# Patient Record
Sex: MALE | Race: WHITE | Employment: UNEMPLOYED | URBAN - METROPOLITAN AREA
[De-identification: names, ages, dates, MRNs, and addresses within clinical notes are randomized per-mention and may not be internally consistent; named-entity substitution may affect disease eponyms.]

---

## 2020-12-26 ENCOUNTER — APPOINTMENT (OUTPATIENT)
Dept: GENERAL RADIOLOGY | Age: 35
End: 2020-12-26
Payer: OTHER MISCELLANEOUS

## 2020-12-26 ENCOUNTER — HOSPITAL ENCOUNTER (EMERGENCY)
Age: 35
Discharge: HOME | End: 2020-12-26
Attending: EMERGENCY MEDICINE
Payer: OTHER MISCELLANEOUS

## 2020-12-26 ENCOUNTER — APPOINTMENT (OUTPATIENT)
Dept: CT IMAGING | Age: 35
End: 2020-12-26
Payer: OTHER MISCELLANEOUS

## 2020-12-26 VITALS
WEIGHT: 160 LBS | RESPIRATION RATE: 16 BRPM | DIASTOLIC BLOOD PRESSURE: 69 MMHG | BODY MASS INDEX: 23.7 KG/M2 | OXYGEN SATURATION: 99 % | TEMPERATURE: 98 F | SYSTOLIC BLOOD PRESSURE: 109 MMHG | HEART RATE: 77 BPM | HEIGHT: 69 IN

## 2020-12-26 PROCEDURE — 99284 EMERGENCY DEPT VISIT MOD MDM: CPT

## 2020-12-26 PROCEDURE — 72100 X-RAY EXAM L-S SPINE 2/3 VWS: CPT

## 2020-12-26 PROCEDURE — 72125 CT NECK SPINE W/O DYE: CPT

## 2020-12-26 PROCEDURE — 6370000000 HC RX 637 (ALT 250 FOR IP): Performed by: EMERGENCY MEDICINE

## 2020-12-26 PROCEDURE — 72072 X-RAY EXAM THORAC SPINE 3VWS: CPT

## 2020-12-26 RX ORDER — CYCLOBENZAPRINE HCL 10 MG
10 TABLET ORAL 3 TIMES DAILY PRN
Qty: 15 TABLET | Refills: 0 | Status: SHIPPED | OUTPATIENT
Start: 2020-12-26 | End: 2020-12-31

## 2020-12-26 RX ORDER — ACETAMINOPHEN 325 MG/1
650 TABLET ORAL ONCE
Status: COMPLETED | OUTPATIENT
Start: 2020-12-26 | End: 2020-12-26

## 2020-12-26 RX ADMIN — ACETAMINOPHEN 650 MG: 325 TABLET ORAL at 19:26

## 2020-12-26 ASSESSMENT — PAIN DESCRIPTION - LOCATION: LOCATION: BACK;NECK

## 2020-12-26 ASSESSMENT — ENCOUNTER SYMPTOMS
ABDOMINAL PAIN: 0
EYE DISCHARGE: 0
DIARRHEA: 0
NAUSEA: 0
SHORTNESS OF BREATH: 0
SORE THROAT: 0
VOMITING: 0
BACK PAIN: 1
COUGH: 0

## 2020-12-26 ASSESSMENT — PAIN SCALES - GENERAL: PAINLEVEL_OUTOF10: 4

## 2020-12-26 ASSESSMENT — PAIN DESCRIPTION - PAIN TYPE: TYPE: ACUTE PAIN
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
## 2024-08-02 ENCOUNTER — HOSPITAL ENCOUNTER (OUTPATIENT)
Dept: ULTRASOUND IMAGING | Age: 39
Discharge: HOME | End: 2024-08-02
Attending: UROLOGY
Payer: COMMERCIAL

## 2024-08-02 DIAGNOSIS — N50.82 SCROTUM PAIN: ICD-10-CM

## 2024-08-02 DIAGNOSIS — N50.819 PAIN IN TESTICLE, UNSPECIFIED LATERALITY: ICD-10-CM

## 2024-08-02 DIAGNOSIS — N45.3 EPIDIDYMO-ORCHITIS: ICD-10-CM

## 2024-08-02 PROCEDURE — 76870 US EXAM SCROTUM: CPT

## 2024-10-21 ENCOUNTER — APPOINTMENT (OUTPATIENT)
Dept: GENERAL RADIOLOGY | Age: 39
End: 2024-10-21
Payer: COMMERCIAL

## 2024-10-21 ENCOUNTER — APPOINTMENT (OUTPATIENT)
Dept: CT IMAGING | Age: 39
End: 2024-10-21
Payer: COMMERCIAL

## 2024-10-21 ENCOUNTER — ANCILLARY PROCEDURE (OUTPATIENT)
Dept: EMERGENCY DEPT | Age: 39
End: 2024-10-21
Attending: STUDENT IN AN ORGANIZED HEALTH CARE EDUCATION/TRAINING PROGRAM
Payer: COMMERCIAL

## 2024-10-21 ENCOUNTER — HOSPITAL ENCOUNTER (INPATIENT)
Age: 39
LOS: 3 days | Discharge: HOME | DRG: 383 | End: 2024-10-24
Attending: INTERNAL MEDICINE | Admitting: INTERNAL MEDICINE
Payer: COMMERCIAL

## 2024-10-21 ENCOUNTER — HOSPITAL ENCOUNTER (EMERGENCY)
Age: 39
Discharge: ANOTHER HOSPITAL | End: 2024-10-21
Attending: STUDENT IN AN ORGANIZED HEALTH CARE EDUCATION/TRAINING PROGRAM
Payer: COMMERCIAL

## 2024-10-21 VITALS
HEIGHT: 69 IN | TEMPERATURE: 97.5 F | BODY MASS INDEX: 24.44 KG/M2 | WEIGHT: 165 LBS | SYSTOLIC BLOOD PRESSURE: 150 MMHG | HEART RATE: 100 BPM | RESPIRATION RATE: 16 BRPM | OXYGEN SATURATION: 95 % | DIASTOLIC BLOOD PRESSURE: 83 MMHG

## 2024-10-21 DIAGNOSIS — L03.113 CELLULITIS OF RIGHT UPPER EXTREMITY: Primary | ICD-10-CM

## 2024-10-21 DIAGNOSIS — L02.91 ABSCESS: Primary | ICD-10-CM

## 2024-10-21 DIAGNOSIS — L03.113 CELLULITIS OF RIGHT UPPER EXTREMITY: ICD-10-CM

## 2024-10-21 DIAGNOSIS — L02.91 ABSCESS: ICD-10-CM

## 2024-10-21 PROBLEM — L03.90 CELLULITIS: Status: ACTIVE | Noted: 2024-10-21

## 2024-10-21 LAB
ALBUMIN SERPL-MCNC: 4.5 G/DL (ref 3.4–5)
ALBUMIN/GLOB SERPL: 1.8 {RATIO} (ref 1.1–2.2)
ALP SERPL-CCNC: 97 U/L (ref 40–129)
ALT SERPL-CCNC: 24 U/L (ref 10–40)
ANION GAP SERPL CALCULATED.3IONS-SCNC: 11 MMOL/L (ref 3–16)
AST SERPL-CCNC: 22 U/L (ref 15–37)
BASOPHILS # BLD: 0 K/UL (ref 0–0.2)
BASOPHILS NFR BLD: 0.6 %
BILIRUB SERPL-MCNC: 0.5 MG/DL (ref 0–1)
BUN SERPL-MCNC: 24 MG/DL (ref 7–20)
CALCIUM SERPL-MCNC: 9.5 MG/DL (ref 8.3–10.6)
CHLORIDE SERPL-SCNC: 100 MMOL/L (ref 99–110)
CO2 SERPL-SCNC: 26 MMOL/L (ref 21–32)
CREAT SERPL-MCNC: 0.8 MG/DL (ref 0.9–1.3)
CRP SERPL-MCNC: 42.2 MG/L (ref 0–5.1)
DEPRECATED RDW RBC AUTO: 13.7 % (ref 12.4–15.4)
EOSINOPHIL # BLD: 0.2 K/UL (ref 0–0.6)
EOSINOPHIL NFR BLD: 2.9 %
ERYTHROCYTE [SEDIMENTATION RATE] IN BLOOD BY WESTERGREN METHOD: 7 MM/HR (ref 0–15)
GFR SERPLBLD CREATININE-BSD FMLA CKD-EPI: >90 ML/MIN/{1.73_M2}
GLUCOSE SERPL-MCNC: 96 MG/DL (ref 70–99)
HCT VFR BLD AUTO: 41.5 % (ref 40.5–52.5)
HGB BLD-MCNC: 14.3 G/DL (ref 13.5–17.5)
LYMPHOCYTES # BLD: 0.8 K/UL (ref 1–5.1)
LYMPHOCYTES NFR BLD: 10.6 %
MCH RBC QN AUTO: 31.8 PG (ref 26–34)
MCHC RBC AUTO-ENTMCNC: 34.5 G/DL (ref 31–36)
MCV RBC AUTO: 92.2 FL (ref 80–100)
MONOCYTES # BLD: 0.9 K/UL (ref 0–1.3)
MONOCYTES NFR BLD: 11.5 %
NEUTROPHILS # BLD: 5.8 K/UL (ref 1.7–7.7)
NEUTROPHILS NFR BLD: 74.4 %
PLATELET # BLD AUTO: 269 K/UL (ref 135–450)
PMV BLD AUTO: 8 FL (ref 5–10.5)
POTASSIUM SERPL-SCNC: 4.2 MMOL/L (ref 3.5–5.1)
PROT SERPL-MCNC: 7 G/DL (ref 6.4–8.2)
RBC # BLD AUTO: 4.5 M/UL (ref 4.2–5.9)
SODIUM SERPL-SCNC: 137 MMOL/L (ref 136–145)
WBC # BLD AUTO: 7.8 K/UL (ref 4–11)

## 2024-10-21 PROCEDURE — 96366 THER/PROPH/DIAG IV INF ADDON: CPT

## 2024-10-21 PROCEDURE — 2580000003 HC RX 258: Performed by: INTERNAL MEDICINE

## 2024-10-21 PROCEDURE — 99285 EMERGENCY DEPT VISIT HI MDM: CPT

## 2024-10-21 PROCEDURE — 6370000000 HC RX 637 (ALT 250 FOR IP): Performed by: NURSE PRACTITIONER

## 2024-10-21 PROCEDURE — 6360000004 HC RX CONTRAST MEDICATION: Performed by: STUDENT IN AN ORGANIZED HEALTH CARE EDUCATION/TRAINING PROGRAM

## 2024-10-21 PROCEDURE — 73201 CT UPPER EXTREMITY W/DYE: CPT

## 2024-10-21 PROCEDURE — 85025 COMPLETE CBC W/AUTO DIFF WBC: CPT

## 2024-10-21 PROCEDURE — 10061 I&D ABSCESS COMP/MULTIPLE: CPT

## 2024-10-21 PROCEDURE — 73130 X-RAY EXAM OF HAND: CPT

## 2024-10-21 PROCEDURE — 96365 THER/PROPH/DIAG IV INF INIT: CPT

## 2024-10-21 PROCEDURE — 80053 COMPREHEN METABOLIC PANEL: CPT

## 2024-10-21 PROCEDURE — 6360000002 HC RX W HCPCS: Performed by: INTERNAL MEDICINE

## 2024-10-21 PROCEDURE — 36415 COLL VENOUS BLD VENIPUNCTURE: CPT

## 2024-10-21 PROCEDURE — 6360000002 HC RX W HCPCS: Performed by: STUDENT IN AN ORGANIZED HEALTH CARE EDUCATION/TRAINING PROGRAM

## 2024-10-21 PROCEDURE — 85652 RBC SED RATE AUTOMATED: CPT

## 2024-10-21 PROCEDURE — 96375 TX/PRO/DX INJ NEW DRUG ADDON: CPT

## 2024-10-21 PROCEDURE — 6370000000 HC RX 637 (ALT 250 FOR IP): Performed by: STUDENT IN AN ORGANIZED HEALTH CARE EDUCATION/TRAINING PROGRAM

## 2024-10-21 PROCEDURE — 87040 BLOOD CULTURE FOR BACTERIA: CPT

## 2024-10-21 PROCEDURE — 6370000000 HC RX 637 (ALT 250 FOR IP): Performed by: INTERNAL MEDICINE

## 2024-10-21 PROCEDURE — 86140 C-REACTIVE PROTEIN: CPT

## 2024-10-21 PROCEDURE — 76882 US LMTD JT/FCL EVL NVASC XTR: CPT

## 2024-10-21 PROCEDURE — 1200000000 HC SEMI PRIVATE

## 2024-10-21 RX ORDER — ACETAMINOPHEN 650 MG/1
650 SUPPOSITORY RECTAL EVERY 6 HOURS PRN
Status: DISCONTINUED | OUTPATIENT
Start: 2024-10-21 | End: 2024-10-24 | Stop reason: HOSPADM

## 2024-10-21 RX ORDER — ENOXAPARIN SODIUM 100 MG/ML
40 INJECTION SUBCUTANEOUS DAILY
Status: DISCONTINUED | OUTPATIENT
Start: 2024-10-21 | End: 2024-10-24 | Stop reason: HOSPADM

## 2024-10-21 RX ORDER — IOPAMIDOL 755 MG/ML
75 INJECTION, SOLUTION INTRAVASCULAR
Status: COMPLETED | OUTPATIENT
Start: 2024-10-21 | End: 2024-10-21

## 2024-10-21 RX ORDER — VANCOMYCIN 1.5 G/300ML
1500 INJECTION, SOLUTION INTRAVENOUS ONCE
Status: COMPLETED | OUTPATIENT
Start: 2024-10-21 | End: 2024-10-21

## 2024-10-21 RX ORDER — POLYETHYLENE GLYCOL 3350 17 G/17G
17 POWDER, FOR SOLUTION ORAL DAILY PRN
Status: DISCONTINUED | OUTPATIENT
Start: 2024-10-21 | End: 2024-10-24 | Stop reason: HOSPADM

## 2024-10-21 RX ORDER — SODIUM CHLORIDE 9 MG/ML
INJECTION, SOLUTION INTRAVENOUS PRN
Status: DISCONTINUED | OUTPATIENT
Start: 2024-10-21 | End: 2024-10-24 | Stop reason: HOSPADM

## 2024-10-21 RX ORDER — ONDANSETRON 2 MG/ML
4 INJECTION INTRAMUSCULAR; INTRAVENOUS EVERY 6 HOURS PRN
Status: DISCONTINUED | OUTPATIENT
Start: 2024-10-21 | End: 2024-10-24 | Stop reason: HOSPADM

## 2024-10-21 RX ORDER — NICOTINE 21 MG/24HR
1 PATCH, TRANSDERMAL 24 HOURS TRANSDERMAL DAILY
Status: DISCONTINUED | OUTPATIENT
Start: 2024-10-21 | End: 2024-10-24 | Stop reason: HOSPADM

## 2024-10-21 RX ORDER — OXYCODONE AND ACETAMINOPHEN 5; 325 MG/1; MG/1
1 TABLET ORAL ONCE
Status: COMPLETED | OUTPATIENT
Start: 2024-10-21 | End: 2024-10-21

## 2024-10-21 RX ORDER — MORPHINE SULFATE 2 MG/ML
2 INJECTION, SOLUTION INTRAMUSCULAR; INTRAVENOUS EVERY 4 HOURS PRN
Status: DISCONTINUED | OUTPATIENT
Start: 2024-10-21 | End: 2024-10-24

## 2024-10-21 RX ORDER — OXYCODONE HYDROCHLORIDE 5 MG/1
5 TABLET ORAL EVERY 4 HOURS PRN
Status: DISCONTINUED | OUTPATIENT
Start: 2024-10-21 | End: 2024-10-24 | Stop reason: HOSPADM

## 2024-10-21 RX ORDER — ONDANSETRON 4 MG/1
4 TABLET, ORALLY DISINTEGRATING ORAL EVERY 8 HOURS PRN
Status: DISCONTINUED | OUTPATIENT
Start: 2024-10-21 | End: 2024-10-24 | Stop reason: HOSPADM

## 2024-10-21 RX ORDER — OXYCODONE HYDROCHLORIDE 5 MG/1
10 TABLET ORAL EVERY 4 HOURS PRN
Status: DISCONTINUED | OUTPATIENT
Start: 2024-10-21 | End: 2024-10-24 | Stop reason: HOSPADM

## 2024-10-21 RX ORDER — ACETAMINOPHEN 325 MG/1
650 TABLET ORAL EVERY 6 HOURS PRN
Status: DISCONTINUED | OUTPATIENT
Start: 2024-10-21 | End: 2024-10-24 | Stop reason: HOSPADM

## 2024-10-21 RX ORDER — VANCOMYCIN HYDROCHLORIDE 1.5 G/300ML
1500 INJECTION, SOLUTION INTRAVITREAL EVERY 12 HOURS
Status: DISCONTINUED | OUTPATIENT
Start: 2024-10-21 | End: 2024-10-24 | Stop reason: HOSPADM

## 2024-10-21 RX ORDER — SODIUM CHLORIDE 0.9 % (FLUSH) 0.9 %
5-40 SYRINGE (ML) INJECTION EVERY 12 HOURS SCHEDULED
Status: DISCONTINUED | OUTPATIENT
Start: 2024-10-21 | End: 2024-10-24 | Stop reason: HOSPADM

## 2024-10-21 RX ORDER — FENTANYL CITRATE 50 UG/ML
50 INJECTION, SOLUTION INTRAMUSCULAR; INTRAVENOUS ONCE
Status: DISCONTINUED | OUTPATIENT
Start: 2024-10-21 | End: 2024-10-21

## 2024-10-21 RX ORDER — KETOROLAC TROMETHAMINE 15 MG/ML
15 INJECTION, SOLUTION INTRAMUSCULAR; INTRAVENOUS ONCE
Status: COMPLETED | OUTPATIENT
Start: 2024-10-21 | End: 2024-10-21

## 2024-10-21 RX ORDER — MORPHINE SULFATE 4 MG/ML
4 INJECTION, SOLUTION INTRAMUSCULAR; INTRAVENOUS EVERY 4 HOURS PRN
Status: DISCONTINUED | OUTPATIENT
Start: 2024-10-21 | End: 2024-10-24

## 2024-10-21 RX ORDER — SODIUM CHLORIDE 0.9 % (FLUSH) 0.9 %
5-40 SYRINGE (ML) INJECTION PRN
Status: DISCONTINUED | OUTPATIENT
Start: 2024-10-21 | End: 2024-10-24 | Stop reason: HOSPADM

## 2024-10-21 RX ADMIN — OXYCODONE 5 MG: 5 TABLET ORAL at 21:38

## 2024-10-21 RX ADMIN — VANCOMYCIN HYDROCHLORIDE 1500 MG: 1.5 INJECTION, SOLUTION INTRAVITREAL at 21:40

## 2024-10-21 RX ADMIN — KETOROLAC TROMETHAMINE 15 MG: 15 INJECTION, SOLUTION INTRAMUSCULAR; INTRAVENOUS at 10:05

## 2024-10-21 RX ADMIN — OXYCODONE HYDROCHLORIDE AND ACETAMINOPHEN 1 TABLET: 5; 325 TABLET ORAL at 10:06

## 2024-10-21 RX ADMIN — IOPAMIDOL 75 ML: 755 INJECTION, SOLUTION INTRAVENOUS at 16:08

## 2024-10-21 RX ADMIN — ACETAMINOPHEN 650 MG: 325 TABLET ORAL at 23:03

## 2024-10-21 RX ADMIN — MORPHINE SULFATE 2 MG: 2 INJECTION, SOLUTION INTRAMUSCULAR; INTRAVENOUS at 23:34

## 2024-10-21 RX ADMIN — OXYCODONE HYDROCHLORIDE AND ACETAMINOPHEN 1 TABLET: 5; 325 TABLET ORAL at 16:43

## 2024-10-21 RX ADMIN — VANCOMYCIN 1500 MG: 1.5 INJECTION, SOLUTION INTRAVENOUS at 10:31

## 2024-10-21 RX ADMIN — Medication 10 ML: at 19:48

## 2024-10-21 ASSESSMENT — PAIN SCALES - GENERAL
PAINLEVEL_OUTOF10: 4
PAINLEVEL_OUTOF10: 3
PAINLEVEL_OUTOF10: 4
PAINLEVEL_OUTOF10: 3
PAINLEVEL_OUTOF10: 4
PAINLEVEL_OUTOF10: 3
PAINLEVEL_OUTOF10: 7
PAINLEVEL_OUTOF10: 10

## 2024-10-21 ASSESSMENT — PAIN DESCRIPTION - LOCATION
LOCATION: HAND
LOCATION: HEAD

## 2024-10-21 ASSESSMENT — PAIN DESCRIPTION - ORIENTATION
ORIENTATION: RIGHT

## 2024-10-21 ASSESSMENT — PAIN DESCRIPTION - DESCRIPTORS
DESCRIPTORS: ACHING

## 2024-10-21 ASSESSMENT — PAIN - FUNCTIONAL ASSESSMENT: PAIN_FUNCTIONAL_ASSESSMENT: 0-10

## 2024-10-21 ASSESSMENT — PAIN DESCRIPTION - PAIN TYPE: TYPE: ACUTE PAIN

## 2024-10-21 NOTE — ED NOTES
1016 - Perfect Serve sent to BESSY Crowley for Orthopedic consult    1155 - BESSY Crowley called and spoke with Dr. Currie at this time.

## 2024-10-21 NOTE — ED PROVIDER NOTES
followingmedications:  Orders Placed This Encounter   Medications    vancomycin (VANCOCIN) 1500 mg in 300 mL IVPB     Order Specific Question:   Antimicrobial Indications     Answer:   Skin and Soft Tissue Infection    ketorolac (TORADOL) injection 15 mg    DISCONTD: fentaNYL (SUBLIMAZE) injection 50 mcg    oxyCODONE-acetaminophen (PERCOCET) 5-325 MG per tablet 1 tablet       CONSULTS:  IP CONSULT TO ORTHOPEDIC SURGERY      CRITICAL CARE TIME   Total Critical Care time was 0 minutes, excluding separately reportable procedures in the context of the following condition na.  There was a high probability of clinically significant/life threatening deterioration in the patient's condition which required my urgent intervention.    Clinical Impression     1. Cellulitis of right upper extremity    2. Abscess        Disposition     PATIENT REFERRED TO:  No follow-up provider specified.    DISCHARGE MEDICATIONS:  New Prescriptions    No medications on file       DISPOSITION Decision To Transfer 10/21/2024 02:26:07 PM  Condition at Disposition: Data Unavailable      Rocky Currie MD (electronically signed)  Attending Emergency Physician    Please note this documentation has been produced using speech recognition software and may contain errors related to that system including errors in grammar, punctuation, and spelling, as well as words and phrases that may be inappropriate.  Efforts were made to edit the dictations.         Rocky Currie MD  10/21/24 8497       Rocky Currie MD  10/21/24 3971

## 2024-10-21 NOTE — PROGRESS NOTES
4 Eyes Skin Assessment and Patient belongings     The patient is being assess for  Admission    I agree that 2 Nurses have performed a thorough Head to Toe Skin Assessment on the patient. ALL assessment sites listed below have been assessed.       Areas assessed by both nurses:   [x]   Head, Face, and Ears   [x]   Shoulders, Back, and Chest  [x]   Arms, Elbows, and Hands Cellulitis R hand, lanced by [place]  [x]   Coccyx, Sacrum, and IschIum  [x]   Legs, Feet, and Heels        Does the Patient have Skin Breakdown?  No         Kamaljit Prevention initiated:  No   Wound Care Orders initiated:  No      [facility] nurse consulted for Pressure Injury (Stage 3,4, Unstageable, DTI, NWPT, and Complex wounds), New and Established Ostomies:  No      I agree that 2 Nurses have reviewed patient belongings with the patient/family and documented in the flowsheet upon admission or transfer to the unit.     Belongings  Dental Appliances: None (partial plate)  Vision - Corrective Lenses: None  Hearing Aid: None  Clothing: Jacket/Coat, Pants, Shirt  Jewelry: None  Electronic Devices: Cell Phone, , Tablet  Weapons (Notify Protective Services/Security): None  Home Medications: None  Valuables Given To: Patient  Provide Name(s) of Who Valuable(s) Were Given To: Rene       Nurse 1 eSignature: Electronically signed by Idalia Morrison RN on 10/21/24 at 6:27 PM EDT    **SHARE this note so that the co-signing nurse is able to place an eSignature**    Nurse 2 eSignature: {Esignature:[id number]}

## 2024-10-22 LAB
ALBUMIN SERPL-MCNC: 4.2 G/DL (ref 3.4–5)
ANION GAP SERPL CALCULATED.3IONS-SCNC: 10 MMOL/L (ref 3–16)
BUN SERPL-MCNC: 19 MG/DL (ref 7–20)
CALCIUM SERPL-MCNC: 9 MG/DL (ref 8.3–10.6)
CHLORIDE SERPL-SCNC: 100 MMOL/L (ref 99–110)
CO2 SERPL-SCNC: 24 MMOL/L (ref 21–32)
CREAT SERPL-MCNC: 0.9 MG/DL (ref 0.9–1.3)
DEPRECATED RDW RBC AUTO: 13.4 % (ref 12.4–15.4)
GFR SERPLBLD CREATININE-BSD FMLA CKD-EPI: >90 ML/MIN/{1.73_M2}
GLUCOSE SERPL-MCNC: 106 MG/DL (ref 70–99)
HCT VFR BLD AUTO: 39 % (ref 40.5–52.5)
HGB BLD-MCNC: 13.4 G/DL (ref 13.5–17.5)
MAGNESIUM SERPL-MCNC: 2 MG/DL (ref 1.8–2.4)
MCH RBC QN AUTO: 31.6 PG (ref 26–34)
MCHC RBC AUTO-ENTMCNC: 34.4 G/DL (ref 31–36)
MCV RBC AUTO: 91.7 FL (ref 80–100)
PHOSPHATE SERPL-MCNC: 4.3 MG/DL (ref 2.5–4.9)
PLATELET # BLD AUTO: 270 K/UL (ref 135–450)
PMV BLD AUTO: 8.4 FL (ref 5–10.5)
POTASSIUM SERPL-SCNC: 4.2 MMOL/L (ref 3.5–5.1)
RBC # BLD AUTO: 4.25 M/UL (ref 4.2–5.9)
SODIUM SERPL-SCNC: 134 MMOL/L (ref 136–145)
VANCOMYCIN SERPL-MCNC: 14.5 UG/ML
WBC # BLD AUTO: 9.8 K/UL (ref 4–11)

## 2024-10-22 PROCEDURE — 6370000000 HC RX 637 (ALT 250 FOR IP): Performed by: INTERNAL MEDICINE

## 2024-10-22 PROCEDURE — 80069 RENAL FUNCTION PANEL: CPT

## 2024-10-22 PROCEDURE — 87205 SMEAR GRAM STAIN: CPT

## 2024-10-22 PROCEDURE — 83735 ASSAY OF MAGNESIUM: CPT

## 2024-10-22 PROCEDURE — 99252 IP/OBS CONSLTJ NEW/EST SF 35: CPT | Performed by: SPECIALIST/TECHNOLOGIST

## 2024-10-22 PROCEDURE — 87070 CULTURE OTHR SPECIMN AEROBIC: CPT

## 2024-10-22 PROCEDURE — 87077 CULTURE AEROBIC IDENTIFY: CPT

## 2024-10-22 PROCEDURE — 87186 SC STD MICRODIL/AGAR DIL: CPT

## 2024-10-22 PROCEDURE — 6370000000 HC RX 637 (ALT 250 FOR IP): Performed by: NURSE PRACTITIONER

## 2024-10-22 PROCEDURE — 6360000002 HC RX W HCPCS: Performed by: SPECIALIST/TECHNOLOGIST

## 2024-10-22 PROCEDURE — 36415 COLL VENOUS BLD VENIPUNCTURE: CPT

## 2024-10-22 PROCEDURE — 2580000003 HC RX 258: Performed by: INTERNAL MEDICINE

## 2024-10-22 PROCEDURE — 6360000002 HC RX W HCPCS: Performed by: INTERNAL MEDICINE

## 2024-10-22 PROCEDURE — 2500000003 HC RX 250 WO HCPCS: Performed by: SPECIALIST/TECHNOLOGIST

## 2024-10-22 PROCEDURE — 80202 ASSAY OF VANCOMYCIN: CPT

## 2024-10-22 PROCEDURE — 1200000000 HC SEMI PRIVATE

## 2024-10-22 PROCEDURE — 86403 PARTICLE AGGLUT ANTBDY SCRN: CPT

## 2024-10-22 PROCEDURE — 85027 COMPLETE CBC AUTOMATED: CPT

## 2024-10-22 PROCEDURE — 0X9J0ZZ DRAINAGE OF RIGHT HAND, OPEN APPROACH: ICD-10-PCS | Performed by: STUDENT IN AN ORGANIZED HEALTH CARE EDUCATION/TRAINING PROGRAM

## 2024-10-22 RX ORDER — LIDOCAINE HYDROCHLORIDE 10 MG/ML
10 INJECTION, SOLUTION INFILTRATION; PERINEURAL ONCE
Status: COMPLETED | OUTPATIENT
Start: 2024-10-22 | End: 2024-10-22

## 2024-10-22 RX ORDER — KETOROLAC TROMETHAMINE 30 MG/ML
15 INJECTION, SOLUTION INTRAMUSCULAR; INTRAVENOUS EVERY 6 HOURS PRN
Status: DISCONTINUED | OUTPATIENT
Start: 2024-10-22 | End: 2024-10-24 | Stop reason: HOSPADM

## 2024-10-22 RX ADMIN — Medication 10 ML: at 09:08

## 2024-10-22 RX ADMIN — VANCOMYCIN HYDROCHLORIDE 1500 MG: 1.5 INJECTION, SOLUTION INTRAVITREAL at 22:03

## 2024-10-22 RX ADMIN — KETOROLAC TROMETHAMINE 15 MG: 30 INJECTION, SOLUTION INTRAMUSCULAR at 14:09

## 2024-10-22 RX ADMIN — Medication 10 ML: at 21:06

## 2024-10-22 RX ADMIN — OXYCODONE 10 MG: 5 TABLET ORAL at 10:48

## 2024-10-22 RX ADMIN — OXYCODONE 10 MG: 5 TABLET ORAL at 21:05

## 2024-10-22 RX ADMIN — LIDOCAINE HYDROCHLORIDE 10 ML: 10 INJECTION, SOLUTION INFILTRATION; PERINEURAL at 12:00

## 2024-10-22 RX ADMIN — OXYCODONE 5 MG: 5 TABLET ORAL at 05:20

## 2024-10-22 RX ADMIN — VANCOMYCIN HYDROCHLORIDE 1500 MG: 1.5 INJECTION, SOLUTION INTRAVITREAL at 10:50

## 2024-10-22 ASSESSMENT — PAIN DESCRIPTION - ORIENTATION
ORIENTATION: RIGHT

## 2024-10-22 ASSESSMENT — PAIN SCALES - GENERAL
PAINLEVEL_OUTOF10: 5
PAINLEVEL_OUTOF10: 7
PAINLEVEL_OUTOF10: 4
PAINLEVEL_OUTOF10: 5
PAINLEVEL_OUTOF10: 2
PAINLEVEL_OUTOF10: 7
PAINLEVEL_OUTOF10: 10
PAINLEVEL_OUTOF10: 3

## 2024-10-22 ASSESSMENT — PAIN DESCRIPTION - DESCRIPTORS
DESCRIPTORS: ACHING;THROBBING
DESCRIPTORS: ACHING
DESCRIPTORS: ACHING
DESCRIPTORS: THROBBING
DESCRIPTORS: ACHING

## 2024-10-22 ASSESSMENT — PAIN DESCRIPTION - LOCATION
LOCATION: HAND

## 2024-10-22 ASSESSMENT — PAIN - FUNCTIONAL ASSESSMENT
PAIN_FUNCTIONAL_ASSESSMENT: PREVENTS OR INTERFERES SOME ACTIVE ACTIVITIES AND ADLS
PAIN_FUNCTIONAL_ASSESSMENT: ACTIVITIES ARE NOT PREVENTED

## 2024-10-22 ASSESSMENT — PAIN DESCRIPTION - PAIN TYPE: TYPE: ACUTE PAIN

## 2024-10-22 NOTE — PROGRESS NOTES
10/22/24 1630   Encounter Summary   Encounter Overview/Reason Advance Care Planning   Service Provided For Patient and family together   Referral/Consult From Nurse   Support System Unknown   Last Encounter    (10/22/2024: Pt declined ACP conversation, said he changed his mind after requesting it.)   Begin Time 1555   End Time  1600   Total Time Calculated 5 min

## 2024-10-22 NOTE — PLAN OF CARE
Problem: Discharge Planning  Goal: Discharge to home or other facility with appropriate resources  10/21/2024 1830 by Idalia Morrison, RN  Outcome: Progressing     Problem: Pain  Goal: Verbalizes/displays adequate comfort level or baseline comfort level  10/21/2024 1830 by Idalia Morrison, RN  Outcome: Progressing

## 2024-10-22 NOTE — CARE COORDINATION
Case Management Assessment  Initial Evaluation    Date/Time of Evaluation: 10/22/2024 10:48 AM  Assessment Completed by: DEMETRI RIDLEY RN    If patient is discharged prior to next notation, then this note serves as note for discharge by case management.    Patient Name: Manuel Johnson                   YOB: 1985  Diagnosis: Cellulitis [L03.90]                   Date / Time: 10/21/2024  5:55 PM    Patient Admission Status: Inpatient   Readmission Risk (Low < 19, Mod (19-27), High > 27): Readmission Risk Score: 4.4    Current PCP: Kelly Arcos APRN - NP  PCP verified by CM? Yes    Chart Reviewed: Yes      History Provided by: Patient  Patient Orientation: Alert and Oriented    Patient Cognition: Alert    Hospitalization in the last 30 days (Readmission):  No    If yes, Readmission Assessment in CM Navigator will be completed.    Advance Directives:      Code Status: Full Code   Patient's Primary Decision Maker is: Legal Next of Kin      Discharge Planning:    Patient lives with: Spouse/Significant Other, Children Type of Home: House  Primary Care Giver: Self  Patient Support Systems include: Spouse/Significant Other, Children   Current Financial resources: Medicaid  Current community resources: None  Current services prior to admission: None            Current DME:              Type of Home Care services:  None    ADLS  Prior functional level: Independent in ADLs/IADLs  Current functional level: Independent in ADLs/IADLs    PT AM-PAC:   /24  OT AM-PAC:   /24    Family can provide assistance at DC: Yes  Would you like Case Management to discuss the discharge plan with any other family members/significant others, and if so, who? No  Plans to Return to Present Housing: Yes  Other Identified Issues/Barriers to RETURNING to current housing: None   Potential Assistance needed at discharge: N/A            Potential DME:    Patient expects to discharge to: House  Plan for transportation at

## 2024-10-22 NOTE — PROGRESS NOTES
[facility] Medicine Progress Note   V10.9     Date of Admission: 10/21/2024  Hospital Day: 2    Chief Admission Complaint: Right upper extremity cellulitis     Subjective: Denies any pain, fevers or chills this morning.  Denies any other new symptoms today.    Rhythm Strip (Telemetry) independently interpreted by me: Normal sinus rhythm on 10/22    Presenting Admission History:       39 y.o. male L hand dominant who presented to [facility] in transfer from [facility] with a 3 day hx of rapidly progressive erythema/edema c/w cellulitis, despite a day of PO Bactrim prescribed by Urgent care visit 20 October.       He underwent I&D at [facility] ED w/out complications and was given a dose of IV Vancomycin.      The patient denies any fever/chills or other signs/sxs of systemic illness or identifiable aggravating/alleviating factors\"    Assessment/Plan:      Current Principal Problem:  Cellulitis    Cellulitis complicated with abscess status post I&D on 10/21  Tobacco dependence      Plan    Continue vancomycin monotherapy given no evidence of septic shock or severe sepsis and mostly targeting rapidly progressive cellulitis with abscess.  Awaiting cultures from the abscess and blood.  Currently negative.  Will expand or narrow coverage as needed.  Orthopedic surgery following.  Wound care consulted.    Physical Exam Performed:      General appearance:  No apparent distress  Respiratory:  Normal respiratory effort.   Cardiovascular:  Regular rate and rhythm.  Abdomen:  Soft, non-tender, non-distended.  Musculoskeletal:  No edema  Neurologic:  Non-focal  Psychiatric:  Alert and oriented    /88   Pulse (!) 103   Temp 98.8 °F (37.1 °C) (Oral)   Resp 16   Ht 1.753 m (5' 9.02\")   Wt 74.8 kg (165 lb)   SpO2 96%   BMI 24.35 kg/m²     Diet: ADULT DIET; Regular  DVT Prophylaxis: [x]PPx LMWH  []SQ Heparin  []IPC/SCDs  []Eliquis  []Xarelto  []Coumadin  []Other -      Code status: Full Code  PT/OT Eval Status:

## 2024-10-22 NOTE — PLAN OF CARE
Pt scoring pain on 0-10 scale. Pain medications given per MAR. Pt instructed to call out when pain level increasing. Call light within reach.     Problem: Pain  Goal: Verbalizes/displays adequate comfort level or baseline comfort level  Outcome: Progressing

## 2024-10-22 NOTE — PLAN OF CARE
Problem: Discharge Planning  Goal: Discharge to home or other facility with appropriate resources  10/21/2024 2044 by Rita Burden, RN  Outcome: Progressing  10/21/2024 1830 by Idalia Morrison RN  Outcome: Progressing     Problem: Pain  Goal: Verbalizes/displays adequate comfort level or baseline comfort level  10/21/2024 2044 by Rita Burden, RN  Outcome: Progressing  10/21/2024 1830 by Idalia Morrison, RN  Outcome: Progressing

## 2024-10-23 PROBLEM — L02.91 ABSCESS: Status: ACTIVE | Noted: 2024-10-23

## 2024-10-23 LAB
CRP SERPL-MCNC: 90.2 MG/L (ref 0–5.1)
DEPRECATED RDW RBC AUTO: 13.5 % (ref 12.4–15.4)
ERYTHROCYTE [SEDIMENTATION RATE] IN BLOOD BY WESTERGREN METHOD: 33 MM/HR (ref 0–15)
HCT VFR BLD AUTO: 41.7 % (ref 40.5–52.5)
HGB BLD-MCNC: 14.1 G/DL (ref 13.5–17.5)
MCH RBC QN AUTO: 31.2 PG (ref 26–34)
MCHC RBC AUTO-ENTMCNC: 33.7 G/DL (ref 31–36)
MCV RBC AUTO: 92.7 FL (ref 80–100)
PLATELET # BLD AUTO: 276 K/UL (ref 135–450)
PMV BLD AUTO: 8.9 FL (ref 5–10.5)
RBC # BLD AUTO: 4.5 M/UL (ref 4.2–5.9)
WBC # BLD AUTO: 8.7 K/UL (ref 4–11)

## 2024-10-23 PROCEDURE — 36415 COLL VENOUS BLD VENIPUNCTURE: CPT

## 2024-10-23 PROCEDURE — 6360000002 HC RX W HCPCS: Performed by: INTERNAL MEDICINE

## 2024-10-23 PROCEDURE — 86140 C-REACTIVE PROTEIN: CPT

## 2024-10-23 PROCEDURE — 99232 SBSQ HOSP IP/OBS MODERATE 35: CPT | Performed by: SPECIALIST/TECHNOLOGIST

## 2024-10-23 PROCEDURE — 6370000000 HC RX 637 (ALT 250 FOR IP): Performed by: NURSE PRACTITIONER

## 2024-10-23 PROCEDURE — 2580000003 HC RX 258: Performed by: INTERNAL MEDICINE

## 2024-10-23 PROCEDURE — 1200000000 HC SEMI PRIVATE

## 2024-10-23 PROCEDURE — 85027 COMPLETE CBC AUTOMATED: CPT

## 2024-10-23 PROCEDURE — 85652 RBC SED RATE AUTOMATED: CPT

## 2024-10-23 PROCEDURE — 6360000002 HC RX W HCPCS: Performed by: SPECIALIST/TECHNOLOGIST

## 2024-10-23 PROCEDURE — 6370000000 HC RX 637 (ALT 250 FOR IP): Performed by: INTERNAL MEDICINE

## 2024-10-23 RX ORDER — LIDOCAINE HYDROCHLORIDE 10 MG/ML
10 INJECTION, SOLUTION INFILTRATION; PERINEURAL ONCE
Status: DISCONTINUED | OUTPATIENT
Start: 2024-10-23 | End: 2024-10-24 | Stop reason: HOSPADM

## 2024-10-23 RX ADMIN — OXYCODONE 5 MG: 5 TABLET ORAL at 10:58

## 2024-10-23 RX ADMIN — VANCOMYCIN HYDROCHLORIDE 1500 MG: 1.5 INJECTION, SOLUTION INTRAVITREAL at 23:01

## 2024-10-23 RX ADMIN — OXYCODONE 5 MG: 5 TABLET ORAL at 23:02

## 2024-10-23 RX ADMIN — OXYCODONE 5 MG: 5 TABLET ORAL at 16:00

## 2024-10-23 RX ADMIN — Medication 10 ML: at 08:21

## 2024-10-23 RX ADMIN — KETOROLAC TROMETHAMINE 15 MG: 30 INJECTION, SOLUTION INTRAMUSCULAR at 14:10

## 2024-10-23 RX ADMIN — Medication 10 ML: at 23:03

## 2024-10-23 RX ADMIN — KETOROLAC TROMETHAMINE 15 MG: 30 INJECTION, SOLUTION INTRAMUSCULAR at 01:06

## 2024-10-23 RX ADMIN — VANCOMYCIN HYDROCHLORIDE 1500 MG: 1.5 INJECTION, SOLUTION INTRAVITREAL at 11:01

## 2024-10-23 ASSESSMENT — PAIN SCALES - GENERAL
PAINLEVEL_OUTOF10: 6
PAINLEVEL_OUTOF10: 7
PAINLEVEL_OUTOF10: 4
PAINLEVEL_OUTOF10: 5
PAINLEVEL_OUTOF10: 0
PAINLEVEL_OUTOF10: 4
PAINLEVEL_OUTOF10: 7
PAINLEVEL_OUTOF10: 4
PAINLEVEL_OUTOF10: 7

## 2024-10-23 ASSESSMENT — PAIN DESCRIPTION - ORIENTATION
ORIENTATION: RIGHT

## 2024-10-23 ASSESSMENT — PAIN DESCRIPTION - DESCRIPTORS
DESCRIPTORS: ACHING;NAGGING;THROBBING
DESCRIPTORS: ACHING
DESCRIPTORS: THROBBING

## 2024-10-23 ASSESSMENT — PAIN DESCRIPTION - LOCATION
LOCATION: HAND
LOCATION: ARM
LOCATION: HAND

## 2024-10-23 ASSESSMENT — PAIN DESCRIPTION - PAIN TYPE
TYPE: ACUTE PAIN
TYPE: ACUTE PAIN

## 2024-10-23 ASSESSMENT — PAIN - FUNCTIONAL ASSESSMENT
PAIN_FUNCTIONAL_ASSESSMENT: ACTIVITIES ARE NOT PREVENTED

## 2024-10-23 ASSESSMENT — PAIN DESCRIPTION - FREQUENCY
FREQUENCY: INTERMITTENT
FREQUENCY: INTERMITTENT

## 2024-10-23 ASSESSMENT — PAIN DESCRIPTION - ONSET
ONSET: GRADUAL
ONSET: GRADUAL

## 2024-10-23 NOTE — PROGRESS NOTES
Department of Orthopedic Surgery  Physician Assistant   Progress Note    Subjective:       Systemic or Specific Complaints:improving with abx, elevation. Was able to tolerate IV pole elevation and warm soapy soaks. Has been trying to express any drainage. No family at bedside    Objective:     Patient Vitals for the past 24 hrs:   BP Temp Temp src Pulse Resp SpO2   10/23/24 1058 -- -- -- -- 16 --   10/23/24 0820 123/82 98.4 °F (36.9 °C) Oral 87 16 98 %   10/22/24 2135 -- -- -- -- 16 --   10/22/24 2105 -- -- -- -- 16 --   10/22/24 2100 115/69 98.9 °F (37.2 °C) Oral 93 16 99 %       General: alert, appears stated age, cooperative, and no distress   Wound: Positive for Erythema but improved and within marked borders, Positive for Edema but improved with skin wrinkling present, and Wound drainage   Motion: Painful range of Motion in affected extremity but improving   DVT Exam: No evidence of DVT seen on physical exam.     Additional exam: Patient seen sitting in bed at time of interview  Incision site mostly closed, there is purulent drainage present  Sensation intact to light touch  Radial pulse 2+      Data Review  CBC:   Lab Results   Component Value Date/Time    WBC 9.8 10/22/2024 05:18 AM    RBC 4.25 10/22/2024 05:18 AM    HGB 13.4 10/22/2024 05:18 AM    HCT 39.0 10/22/2024 05:18 AM     10/22/2024 05:18 AM       Renal:   Lab Results   Component Value Date/Time     10/22/2024 05:18 AM    K 4.2 10/22/2024 05:18 AM    K 4.2 10/21/2024 09:59 AM     10/22/2024 05:18 AM    CO2 24 10/22/2024 05:18 AM    BUN 19 10/22/2024 05:18 AM    CREATININE 0.9 10/22/2024 05:18 AM    GLUCOSE 106 10/22/2024 05:18 AM    CALCIUM 9.0 10/22/2024 05:18 AM      Culture, Wound [[id number]] (Abnormal)    Collected: 10/22/24 1045    Updated: 10/23/24 1022    Specimen Type: Abscess     Gram Stain Result No WBCs or organisms seen    Organism Staph aureus MRSA Abnormal     WOUND/ABSCESS -- Abnormal     Moderate

## 2024-10-23 NOTE — CARE COORDINATION
Swelling of hand significantly improved. Still having some drainage from abscess per progress notes. Blood cx negative to date. Follow for needs/barriers to discharge. IPTA, denied needs.

## 2024-10-23 NOTE — PROGRESS NOTES
Hospital Medicine Progress Note   V10.9     Date of Admission: 10/21/2024  Hospital Day: 3    Chief Admission Complaint: Right upper extremity cellulitis     Subjective: Swelling on the right hand significantly improved still having some drainage from the abscess.    Rhythm Strip (Telemetry) independently interpreted by me: Normal sinus rhythm on 10/23    Presenting Admission History:       39 y.o. male L hand dominant who presented to [facility] in transfer from [facility] with a 3 day hx of rapidly progressive erythema/edema c/w cellulitis, despite a day of PO Bactrim prescribed by Urgent care visit 20 October.       He underwent I&D at [facility] ED w/out complications and was given a dose of IV Vancomycin.      The patient denies any fever/chills or other signs/sxs of systemic illness or identifiable aggravating/alleviating factors\"    Assessment/Plan:      Current Principal Problem:  Cellulitis    Cellulitis complicated with abscess status post I&D on 10/21  Tobacco dependence      Plan    Continue vancomycin monotherapy given no evidence of septic shock or severe sepsis and mostly targeting rapidly progressive cellulitis with abscess.  Abscess culture grew MRSA awaiting for susceptibility.  Blood cultures remain negative to date.  Orthopedic surgery following.  Wound care consulted.    Physical Exam Performed:      General appearance:  No apparent distress  Respiratory:  Normal respiratory effort.   Cardiovascular:  Regular rate and rhythm.  Abdomen:  Soft, non-tender, non-distended.  Musculoskeletal:  No edema  Neurologic:  Non-focal  Psychiatric:  Alert and oriented    /82   Pulse 87   Temp 98.4 °F (36.9 °C) (Oral)   Resp 16   Ht 1.753 m (5' 9.02\")   Wt 74.8 kg (165 lb)   SpO2 98%   BMI 24.35 kg/m²     Diet: ADULT DIET; Regular  DVT Prophylaxis: [x]PPx LMWH  []SQ Heparin  []IPC/SCDs  []Eliquis  []Xarelto  []Coumadin  []Other -      Code status: Full Code  PT/OT Eval Status:   [x]NOT yet

## 2024-10-23 NOTE — PLAN OF CARE
Pt scoring pain on 0-10 scale. Pain medications given per MAR. Pt instructed to call out when pain level increasing. Call light within reach.     Problem: Pain  Goal: Verbalizes/displays adequate comfort level or baseline comfort level  10/23/2024 0827 by Meghan Calderon, RN  Outcome: Progressing  10/22/2024 2138 by Rita Burden, RN  Outcome: Progressing

## 2024-10-23 NOTE — PLAN OF CARE
Problem: Discharge Planning  Goal: Discharge to home or other facility with appropriate resources  Outcome: Progressing     Problem: Pain  Goal: Verbalizes/displays adequate comfort level or baseline comfort level  10/22/2024 2138 by Rita Burden RN  Outcome: Progressing  10/22/2024 1442 by Meghan Calderon, RN  Outcome: Progressing     Problem: Safety - Adult  Goal: Free from fall injury  Outcome: Progressing

## 2024-10-23 NOTE — PROGRESS NOTES
IV pole elevation removed- patient requested to take some time off.   Kept ace wrap dressing on right hand

## 2024-10-23 NOTE — CONSULTS
Department of Orthopedic Surgery  Physician Assistant   Consult Note        Reason for Consult:  right hand abscess/cellulitis  Requesting Physician: Brian Suarez DO  Date of Service: 10/22/2024 9:18 AM    CHIEF COMPLAINT:  As Above    History Obtained From:  patient, electronic medical record    HISTORY OF PRESENT ILLNESS:                The patient is a left hand dominant 39 y.o. male who presents with above chief complaint.  Patient states that on Friday he began to notice some pain in the back of his hand with associated redness and swelling, his symptoms worsened and a lesion concerning for abscess appeared which prompted him to come to [facility] emergency department where he underwent incision and drainage, was given IV antibiotics, transferred to [facility] for orthopedic consult and further care.  Patient notes minimal improvement since hospital presentation, pain is worse with movement or pressing on the area involved.  Denies any fever, chills, nausea, vomiting, feeling unwell, headache. Patient has been trying to elevate his hand.  Family at bedside    Past Medical History:    No past medical history on file.  Past Surgical History:    No past surgical history on file.      Medications Prior to Admission:   Prior to Admission medications    Not on File       Allergies:  Patient has no known allergies.    Social History:    Tobacco:  reports that he has been smoking cigarettes. He has never used smokeless tobacco.   Alcohol:  reports current alcohol use of about 4.0 standard drinks of alcohol per week.   Illicit Drug: No  Family History:   No family history on file.    REVIEW OF SYSTEMS:    CONSTITUTIONAL:  negative  MUSCULOSKELETAL:  positive for  pain  All other systems reviewed and negative    PHYSICAL EXAM:    awake, alert, cooperative, no apparent distress, and appears stated age  MUSCULOSKELETAL:  there is no redness, warmth, or swelling of the joints  full range of 
  Pharmacy Note  Vancomycin Consult    Manuel Johnson is a 39 y.o. male started on Vancomycin for SSTI x 5 days; consult received from Dr. Andrew Prasad to manage therapy.   Allergies:  Patient has no known allergies.     Tmax: 99.2        Recent Labs     10/21/24  0959   CREATININE 0.8*       Recent Labs     10/21/24  0959   WBC 7.8       Estimated Creatinine Clearance: 122 mL/min (A) (based on SCr of 0.8 mg/dL (L)).    No intake or output data in the 24 hours ending 10/21/24 1806    Wt Readings from Last 1 Encounters:   10/21/24 74.8 kg (165 lb)         There is no height or weight on file to calculate BMI.    Loading dose (critically ill or in ICU, require dialysis or renal replacement therapy): Vancomycin 25 mg/kg IVPB x 1 (maximum 2500 mg).  Maintenance dose: 10-20 mg/kg (maximum: 2000 mg/dose and 4500 mg/day) starting at the next dosing interval determined by renal function  Pulse dose: fluctuating renal function, FITZ, ESRD  CRRT: 7.5-10 mg/kg q12h   Goal Vancomycin trough: 15-20 mcg/mL   Goal Vancomycin AUC: 400-600     Assessment/Plan:  Will initiate Vancomycin with a one time loading dose of 1500 mg x1, followed by 1500 mg IV every 12 hours. Calculated Vancomycin AUC = 531 mg/L*h with an estimated steady-state vancomycin trough = 12.6 mcg/mL. Vancomycin level ordered for 10/22 0600. Timing of trough level will be determined based on culture results, renal function, and clinical response.     Thank you for the consult.  Mariano Guerrier, PharmD 10/21/2024 6:06 PM    Vancomycin Day 3  No results for input(s): \"VANCOTROUGH\" in the last 72 hours.  Recent Labs     10/22/24  0518   VANCORANDOM 14.5     Recent Labs     10/21/24  0959 10/22/24  0518   CREATININE 0.8* 0.9     Estimated Creatinine Clearance: 108 mL/min (based on SCr of 0.9 mg/dL).  Recent Labs     10/21/24  0959 10/22/24  0518   WBC 7.8 9.8     Plan continue current regimen of vancomycin 1500mg q12h  Predicted auc : 538 mg/L.hr  Redicted trough: 
Consult [facility]/called to Orthopedics on 10/21/24 at 6:40 PM Karol Patterson  
erythema and swelling just distal to the wrist    -No obvious fluctuance, but some bogginess is present, no active drainage or purulence noted, abscess/wound measures approximately 2 x 2 cm       RADIOLOGY:       Relevant previous imaging reviewed, both imaging and report(s) as below:    CT HAND RIGHT W CONTRAST  Result Date: 10/21/2024  Cellulitis but negative for abscess     XR HAND RIGHT (MIN 3 VIEWS)  Result Date: 10/21/2024  Soft tissue swelling without osseous abnormality          LABS:   Recent Labs     10/22/24  0518 10/23/24  0757   WBC 9.8 8.7   HGB 13.4* 14.1   HCT 39.0* 41.7    276   *  --    K 4.2  --    BUN 19  --    CREATININE 0.9  --    GLUCOSE 106*  --       Lab Results   Component Value Date    CREATININE 0.9 10/22/2024     Lab Results   Component Value Date    WBC 8.7 10/23/2024     Lab Results   Component Value Date    CRP 90.2 (H) 10/23/2024     Lab Results   Component Value Date    SEDRATE 33 (H) 10/23/2024         -10/21/2024: WBCs 7.8, CRP 42, ESR 7  -10/23/2024: WBCs 8.7, CRP 90.2, ESR 33    CULTURE:  Recent Labs     10/22/24  1045   ORG Staph aureus MRSA*         ASSESSMENT AND PLAN:  Body mass index is 24.35 kg/m².    He has a right hand infection, that appears superficial.  At this time, I have a low suspicion for a deep infection/septic arthritis.   -On 10/23/2024, we discussed multiple treatment options, including bedside I&D, formal I&D in the OR, wound packing, as well as antibiotics.       Notably, he has the past medical history as above.     We had a discussion today about the likely diagnosis and its natural history, physical exam and imaging findings, as well as various treatment options in detail.    Surgically, we discussed performing a formal I&D in the OR, depending on the results of a bedside I&D.  We discussed the risks of pain, infection, bleeding/blood clot, injury to surrounding structures (such as vessel, soft tissue, nerves, tendons), future surgery,

## 2024-10-24 VITALS
WEIGHT: 165 LBS | TEMPERATURE: 97.9 F | DIASTOLIC BLOOD PRESSURE: 82 MMHG | BODY MASS INDEX: 24.44 KG/M2 | HEART RATE: 87 BPM | OXYGEN SATURATION: 95 % | HEIGHT: 69 IN | SYSTOLIC BLOOD PRESSURE: 126 MMHG | RESPIRATION RATE: 16 BRPM

## 2024-10-24 LAB
BACTERIA SPEC AEROBE CULT: ABNORMAL
GRAM STN SPEC: ABNORMAL
ORGANISM: ABNORMAL

## 2024-10-24 PROCEDURE — 2580000003 HC RX 258: Performed by: INTERNAL MEDICINE

## 2024-10-24 PROCEDURE — 6360000002 HC RX W HCPCS: Performed by: INTERNAL MEDICINE

## 2024-10-24 PROCEDURE — 6370000000 HC RX 637 (ALT 250 FOR IP): Performed by: INTERNAL MEDICINE

## 2024-10-24 PROCEDURE — 99231 SBSQ HOSP IP/OBS SF/LOW 25: CPT | Performed by: SPECIALIST/TECHNOLOGIST

## 2024-10-24 PROCEDURE — 6370000000 HC RX 637 (ALT 250 FOR IP): Performed by: NURSE PRACTITIONER

## 2024-10-24 RX ORDER — OXYCODONE AND ACETAMINOPHEN 5; 325 MG/1; MG/1
1 TABLET ORAL EVERY 6 HOURS PRN
Qty: 12 TABLET | Refills: 0 | Status: SHIPPED | OUTPATIENT
Start: 2024-10-24 | End: 2024-10-27

## 2024-10-24 RX ORDER — DOXYCYCLINE HYCLATE 100 MG
100 TABLET ORAL 2 TIMES DAILY
Qty: 14 TABLET | Refills: 0 | Status: SHIPPED | OUTPATIENT
Start: 2024-10-24 | End: 2024-10-31

## 2024-10-24 RX ADMIN — VANCOMYCIN HYDROCHLORIDE 1500 MG: 1.5 INJECTION, SOLUTION INTRAVITREAL at 10:32

## 2024-10-24 RX ADMIN — OXYCODONE 10 MG: 5 TABLET ORAL at 08:08

## 2024-10-24 RX ADMIN — Medication 10 ML: at 08:09

## 2024-10-24 RX ADMIN — OXYCODONE 10 MG: 5 TABLET ORAL at 14:47

## 2024-10-24 ASSESSMENT — PAIN SCALES - GENERAL
PAINLEVEL_OUTOF10: 4
PAINLEVEL_OUTOF10: 7
PAINLEVEL_OUTOF10: 8

## 2024-10-24 ASSESSMENT — PAIN DESCRIPTION - LOCATION
LOCATION: HAND
LOCATION: HAND

## 2024-10-24 ASSESSMENT — PAIN DESCRIPTION - ONSET: ONSET: GRADUAL

## 2024-10-24 ASSESSMENT — PAIN DESCRIPTION - PAIN TYPE: TYPE: ACUTE PAIN;SURGICAL PAIN

## 2024-10-24 ASSESSMENT — PAIN DESCRIPTION - DESCRIPTORS
DESCRIPTORS: THROBBING
DESCRIPTORS: THROBBING

## 2024-10-24 ASSESSMENT — PAIN DESCRIPTION - ORIENTATION
ORIENTATION: RIGHT
ORIENTATION: RIGHT

## 2024-10-24 ASSESSMENT — PAIN DESCRIPTION - FREQUENCY: FREQUENCY: INTERMITTENT

## 2024-10-24 NOTE — PROGRESS NOTES
Department of Orthopedic Surgery  Physician Assistant   Progress Note    Subjective:       Systemic or Specific Complaints: hand improved today, swelling almost completely resolved, has been elevating. Packing in place.     Objective:     Patient Vitals for the past 24 hrs:   BP Temp Temp src Pulse Resp SpO2   10/24/24 0819 126/82 97.9 °F (36.6 °C) Oral 87 16 95 %   10/23/24 2136 120/83 98.1 °F (36.7 °C) Oral 92 16 98 %   10/23/24 1058 -- -- -- -- 16 --       General: alert, appears stated age, cooperative, and no distress   Wound: Positive for Erythema but improved and within marked borders, Positive for Edema but improved with skin wrinkling present, and Wound drainage   Motion: Painful range of Motion in affected extremity but improving   DVT Exam: No evidence of DVT seen on physical exam.     Additional exam: Patient seen sitting in chair at time of interview  Packing in place, removed, no purulence noted  Sensation intact to light touch  Able to move fingers and wrist  Radial pulse 2+      Data Review  CBC:   Lab Results   Component Value Date/Time    WBC 8.7 10/23/2024 07:57 AM    RBC 4.50 10/23/2024 07:57 AM    HGB 14.1 10/23/2024 07:57 AM    HCT 41.7 10/23/2024 07:57 AM     10/23/2024 07:57 AM       Renal:   Lab Results   Component Value Date/Time     10/22/2024 05:18 AM    K 4.2 10/22/2024 05:18 AM    K 4.2 10/21/2024 09:59 AM     10/22/2024 05:18 AM    CO2 24 10/22/2024 05:18 AM    BUN 19 10/22/2024 05:18 AM    CREATININE 0.9 10/22/2024 05:18 AM    GLUCOSE 106 10/22/2024 05:18 AM    CALCIUM 9.0 10/22/2024 05:18 AM      Culture, Wound [[id number]] (Abnormal)    Collected: 10/22/24 1045    Updated: 10/23/24 1022    Specimen Type: Abscess     Gram Stain Result No WBCs or organisms seen    Organism Staph aureus MRSA Abnormal     WOUND/ABSCESS -- Abnormal     Moderate growth  Sensitivity to follow  CONTACT PRECAUTIONS INDICATED  PBP2= POSITIVE   Abnormal    Narrative:     ORDER#: [id number]

## 2024-10-24 NOTE — DISCHARGE SUMMARY
TECHNOLOGIST PROVIDED HISTORY: Reason for exam:->rule out abscess and osteomylitis Additional Contrast?->Radiologist Recommendation Reason for Exam: r/o abcess or osteomyletis FINDINGS: Bones: No periosteal reaction or cortical destruction. Soft Tissue: Edema in the subcutaneous tissues of the dorsum of the hand.  No visualized encapsulated fluid collection.  No gas in the soft tissues. As can be seen, the tendons are unremarkable.  The muscles are unremarkable. Joint: No significant degenerative changes. No osseous erosions.     Cellulitis but negative for abscess     POC US EXTREMITY NON VASC LIMITED    Result Date: 10/21/2024  POCUS_MSK     Exam Information:          Exam type:  Clinically indicated     Indication(s) for Exam:          The exam was performed with the following indications::  Soft tissue swelling         Other Indication(s):  abscess and cellulitis     Views Obtained & Images Saved for These Views:          Precise structure and location::  right hand abscess and cellulitis     Findings:          Detail of findings::  abscess and cellulitis     Interpretation:          Other::  abscess and cellulitis     Confirmatory study:          What confirmatory study was done?:  Not applicable  Electronically signed by Lino Currie (Nathan)  on Monday, October 21, 2024 at 4:22 PM : Attending:     XR HAND RIGHT (MIN 3 VIEWS)    Result Date: 10/21/2024  EXAMINATION: THREE XRAY VIEWS OF THE RIGHT HAND 10/21/2024 9:41 am COMPARISON: None. HISTORY: ORDERING SYSTEM PROVIDED HISTORY: soft tissue infection TECHNOLOGIST PROVIDED HISTORY: Reason for exam:->soft tissue infection FINDINGS: There is moderate soft tissue swelling dorsally.  The osseous structures and joint spaces are intact without evidence of fracture, malalignment or bone destruction.  There is no radiopaque foreign body.     Soft tissue swelling without osseous abnormality       Consults:     IP CONSULT TO PHARMACY  IP CONSULT TO ORTHOPEDIC

## 2024-10-24 NOTE — CARE COORDINATION
CASE MANAGEMENT DISCHARGE SUMMARY      Discharge to: Home with family    Precertification completed: N/A  Hospital Exemption Notification (HENS) completed: N/A    IMM given: (date) N/A    New Durable Medical Equipment ordered/agency: N/A    Transportation:    Family/car: spouse/private car       Confirmed discharge plan with: Home with spouse. No needs for Home care at time of discharge. Patient will follow up with Dr Crespo in 10 days and PCP for wound check. Spouse will be doing dressing changes per instructions.     Patient: yes     Family:  Patient has reviewed discharge with spouse.       RN, name: Jennifer    Note: Discharging nurse to complete WAGNER, reconcile AVS, and place final copy with patient's discharge packet. RN to ensure that written prescriptions for  Level II medications are sent with patient to the facility as per protocol.  .Roxanna Mandujano RN

## 2024-10-24 NOTE — CARE COORDINATION
CM update: LOS # 3; Followed by IM, Orthopedic Surgery, Spiritual Care. Patient had I&D of right hand with Iodoform packing placed. Decision on discharge ATB not made as of this time. Patient states his wife can do dressing changes. Will follow for final discharge decisions. Roxanna Mandujano RN

## 2024-10-24 NOTE — DISCHARGE INSTR - COC
Continuity of Care Form    Patient Name: Manuel Johnson   :  1985  MRN:  [id number]    Admit date:  10/21/2024  Discharge date:  ***    Code Status Order: Full Code   Advance Directives:   Advance Care Flowsheet Documentation             Admitting Physician:  Andrew Prasad MD  PCP: Kelly Arcos APRN - NP    Discharging Nurse: ***  Discharging Hospital Unit/Room#: 0525/0525-01  Discharging Unit Phone Number: ***    Emergency Contact:   Extended Emergency Contact Information  Primary Emergency Contact: Kerri Johnson  Address: [address] [id number] [facility]  Home Phone: [phone]  Mobile Phone: [phone]  Relation: Spouse  Secondary Emergency Contact: Manuel Johnson  Address: [address] [place] [address]  Home Phone: [phone]  Work Phone: [phone]  Relation: Parent    Past Surgical History:  No past surgical history on file.    Immunization History:   Immunization History   Administered Date(s) Administered    COVID-19, J&J, (age 18y+), IM, 0.5 mL 2021       Active Problems:  Patient Active Problem List   Diagnosis Code    Cellulitis L03.90    Abscess L02.91       Isolation/Infection:   Isolation            Contact          Patient Infection Status       Infection Onset Added Last Indicated Last Indicated By Review Planned Expiration Resolved Resolved By    MRSA 10/22/24 10/23/24 10/22/24 Culture, Wound                Nurse Assessment:  Last Vital Signs: /82   Pulse 87   Temp 97.9 °F (36.6 °C) (Oral)   Resp 16   Ht 1.753 m (5' 9.02\")   Wt 74.8 kg (165 lb)   SpO2 95%   BMI 24.35 kg/m²     Last documented pain score (0-10 scale): Pain Level: 8  Last Weight:   Wt Readings from Last 1 Encounters:   10/21/24 74.8 kg (165 lb)     Mental Status:  {IP PT MENTAL STATUS:}    IV Access:  {[facility] IV ACCESS:[id number]}    Nursing Mobility/ADLs:  Walking   {[facility] DME ADLs:[id number]}  Transfer  {[facility] DME

## 2024-10-24 NOTE — DISCHARGE INSTRUCTIONS
Packing change daily until wound is healed. Please call physician if increased redness around incision, increased drainage, fevers, or pain becomes significantly worse.      OK to wash hand and let warm soapy water run over incision while packing is out. Avoid getting packing wet    F/U with Dr Crespo in 10-14 days.  Call [facility] for appointment time and date for follow up at [phone].        Weight Bearing on Surgical Side: light activity to the right hand. No heavy lifting, stay away from dirty environments    Take antibiotics as prescribed, finish entire course of antibiotics, even if you are feeling better    Pain Medications  Wean off pain medications as you deem appropriate as long as pain is under control  Be sure to drink plenty of fluids (recommend water) while taking narcotic pain medications to prevent constipation   You may take an over the counter laxative or stool softener as needed to prevent/treat constipation as well, we recommend miralax/glycolax.  We recommend that you consider taking these medications the entire time you are taking pain medication.  Cold packs/Ice packs/Machine  May be used as much as necessary to control swelling/inflammation/soreness  Be sure to have a barrier (cloth, clothing, towel) between the site and the ice pack to prevent frostbite  Contact [facility] office [phone] if  Increased redness, swelling, drainage of any kind, and/or pain to surgery site.  As well as new onset fevers and or chills.  These could signify an infection.  Calf or thigh tenderness to touch as well as increased swelling or redness.  This could signify a clot formation.  Numbness or tingling to an area around the incision site or below the incision site (toes).  Any rash appears, increased  or new onset nausea/vomiting occur.  This may indicate a reaction to a medication.

## 2024-10-25 LAB
BACTERIA BLD CULT ORG #2: NORMAL
BACTERIA BLD CULT: NORMAL

## 2024-10-30 ENCOUNTER — TELEPHONE (OUTPATIENT)
Dept: ORTHOPEDIC SURGERY | Age: 39
End: 2024-10-30

## 2025-01-18 ENCOUNTER — HOSPITAL ENCOUNTER (OUTPATIENT)
Dept: GENERAL RADIOLOGY | Age: 40
Discharge: HOME | End: 2025-01-18
Payer: COMMERCIAL

## 2025-01-18 ENCOUNTER — HOSPITAL ENCOUNTER (OUTPATIENT)
Age: 40
Discharge: HOME | End: 2025-01-18
Payer: COMMERCIAL

## 2025-01-18 DIAGNOSIS — M25.561 ARTHRALGIA OF BOTH KNEES: ICD-10-CM

## 2025-01-18 DIAGNOSIS — M25.562 ARTHRALGIA OF BOTH KNEES: ICD-10-CM

## 2025-01-18 PROCEDURE — 73560 X-RAY EXAM OF KNEE 1 OR 2: CPT
